# Patient Record
Sex: FEMALE | Race: WHITE | Employment: FULL TIME | URBAN - NONMETROPOLITAN AREA
[De-identification: names, ages, dates, MRNs, and addresses within clinical notes are randomized per-mention and may not be internally consistent; named-entity substitution may affect disease eponyms.]

---

## 2021-04-06 ENCOUNTER — TELEPHONE (OUTPATIENT)
Dept: OBGYN CLINIC | Age: 51
End: 2021-04-06

## 2021-04-06 NOTE — TELEPHONE ENCOUNTER
Pt called to resched annual due to work conflict. She would like to get an extension on her [facility] if possible sent to [facility] on [address] [place] [employer].  Please and Thank YOU !!

## 2021-04-07 DIAGNOSIS — N92.0 EXCESSIVE OR FREQUENT MENSTRUATION: Primary | ICD-10-CM

## 2021-04-07 RX ORDER — NORGESTIMATE AND ETHINYL ESTRADIOL 0.25-0.035
1 KIT ORAL DAILY
Qty: 1 PACKET | Refills: 3 | Status: SHIPPED | OUTPATIENT
Start: 2021-04-07 | End: 2022-07-11 | Stop reason: ALTCHOICE

## 2021-06-21 ENCOUNTER — OFFICE VISIT (OUTPATIENT)
Dept: OBGYN CLINIC | Age: 51
End: 2021-06-21
Payer: COMMERCIAL

## 2021-06-21 ENCOUNTER — HOSPITAL ENCOUNTER (OUTPATIENT)
Age: 51
Setting detail: SPECIMEN
Discharge: HOME | End: 2021-06-21
Payer: COMMERCIAL

## 2021-06-21 VITALS
SYSTOLIC BLOOD PRESSURE: 126 MMHG | HEIGHT: 66 IN | WEIGHT: 169 LBS | DIASTOLIC BLOOD PRESSURE: 81 MMHG | BODY MASS INDEX: 27.16 KG/M2

## 2021-06-21 DIAGNOSIS — Z01.419 WOMEN'S ANNUAL ROUTINE GYNECOLOGICAL EXAMINATION: Primary | ICD-10-CM

## 2021-06-21 PROCEDURE — 36415 COLL VENOUS BLD VENIPUNCTURE: CPT | Performed by: NURSE PRACTITIONER

## 2021-06-21 PROCEDURE — 99396 PREV VISIT EST AGE 40-64: CPT | Performed by: NURSE PRACTITIONER

## 2021-06-21 RX ORDER — HYDROQUINONE 40 MG/G
CREAM TOPICAL
COMMUNITY
Start: 2021-05-24 | End: 2022-07-11 | Stop reason: ALTCHOICE

## 2021-06-21 NOTE — PROGRESS NOTES
encouraged SBE    History reviewed. No pertinent past medical history. Past Surgical History:   Procedure Laterality Date    TONSILLECTOMY         Family History   Problem Relation Age of Onset    Alzheimer's Disease Maternal Grandmother     Heart Attack Maternal Grandfather     Diabetes Mother     Hypertension Mother     Osteoarthritis Mother     Asthma Brother        Chief Complaint   Patient presents with    Gynecologic Exam     Pap due. NL pap 5/2018. Voices no concerns. PE:  Vital Signs  Blood pressure 126/81, height 5' 5.5\" (1.664 m), weight 169 lb (76.7 kg), last menstrual period 06/09/2021. Estimated body mass index is 27.7 kg/m² as calculated from the following:    Height as of this encounter: 5' 5.5\" (1.664 m). Weight as of this encounter: 169 lb (76.7 kg). HPI: Patient presents today for annual exam. Feeling well, voices no concerns. Denies breast/pelvic pain. Pap due. Mammogram UTD. Monthly menses with OCP. Wellness reviewed. Review of Systems   All other systems reviewed and are negative. Objective  Heent:   negative   Cor: regular rate and rhythm, no murmurs              Pul:clear to auscultation bilaterally- no wheezes, rales or rhonchi, normal air movement, no respiratory distress      GI: Abdomen soft, non-tender. BS normal. No masses,  No organomegaly           Extremities: normal strength, tone, and muscle mass, ROM of all joints is normal   Breasts: Breast:normal appearance, no masses or tenderness, No nipple retraction or dimpling, No nipple discharge or bleeding   Pelvic Exam: GENITAL/URINARY:  External Genitalia:  General appearance; normal, Hair distribution; normal, Lesions absent  Urethral Meatus:  Size normal, Location normal, Lesions absent, Prolapse absent  Urethra:   Fullness absent, Masses absent  Bladder:  Fullness absent, Masses absent, Tenderness absent, Cystocele absent  Vagina:  General appearance normal, Estrogen effect normal, Discharge absent, Lesions absent, Pelvic support normal  Cervix:  General appearance normal, Lesions absent, Discharge absent, Tenderness absent, Enlargement absent, Nodularity absent  Uterus:  Size normal, Tenderness absent  Adenexa: Masses absent, Tenderness absent  Anus/Perineum:  Lesions absent and Masses absent                                    Vaginal discharge: no vaginal discharge   Chaperone: not present                     Assessment and Plan          Diagnosis Orders   1. Women's annual routine gynecological examination  PAP SMEAR    Follicle Stimulating Hormone    Estradiol     Will d/c OCP x1-2 months prior to completing labs to evaluate for menopause. Patient agreeable, if not menopausal will resume POP. I am having Nevin Carballo maintain her norgestimate-ethinyl estradiol, tretinoin, and hydroquinone. Return in about 1 year (around 6/21/2022) for yearly. She was also counseled on her preventative health maintenance recommendations and follow-up. There are no Patient Instructions on file for this visit.     KISHOR English NP,6/22/2021 12:29 PM

## 2021-06-23 LAB
HPV SAMPLE: NORMAL
HPV, GENOTYPE 16: NOT DETECTED
HPV, GENOTYPE 18: NOT DETECTED
HPV, HIGH RISK OTHER: NOT DETECTED
HPV, INTERPRETATION: NORMAL
SPECIMEN DESCRIPTION: NORMAL

## 2021-06-24 LAB — CYTOLOGY REPORT: NORMAL

## 2022-07-11 ENCOUNTER — OFFICE VISIT (OUTPATIENT)
Dept: OBGYN CLINIC | Age: 52
End: 2022-07-11
Payer: COMMERCIAL

## 2022-07-11 VITALS — DIASTOLIC BLOOD PRESSURE: 76 MMHG | WEIGHT: 175.8 LBS | BODY MASS INDEX: 28.81 KG/M2 | SYSTOLIC BLOOD PRESSURE: 120 MMHG

## 2022-07-11 DIAGNOSIS — Z01.419 WOMEN'S ANNUAL ROUTINE GYNECOLOGICAL EXAMINATION: Primary | ICD-10-CM

## 2022-07-11 PROCEDURE — 99396 PREV VISIT EST AGE 40-64: CPT | Performed by: NURSE PRACTITIONER

## 2022-07-11 ASSESSMENT — ENCOUNTER SYMPTOMS
SHORTNESS OF BREATH: 0
CONSTIPATION: 0
ABDOMINAL PAIN: 0
DIARRHEA: 0

## 2022-07-11 NOTE — PROGRESS NOTES
YEARLY PHYSICAL    Date of service: 2022    Liset Golden  Is a 47 y.o.   female    PT's PCP is: Mai Babin MD     : 1970                                         Chaperone for Intimate Exam   Chaperone was offered as part of the rooming process. Patient declined and agrees to continue with exam without a chaperone.  Chaperone: n/a      Subjective:       Patient's last menstrual period was 2021. Are your menses regular: not applicable    OB History    Para Term  AB Living   2 2 2     2   SAB IAB Ectopic Molar Multiple Live Births             2      # Outcome Date GA Lbr Ole/2nd Weight Sex Delivery Anes PTL Lv   2 Term  40w0d  8 lb (3.629 kg) F Vag-Spont   CHEYENNE   1 Term  40w0d  7 lb (3.175 kg) F Vag-Spont   CHEYENNE        Social History     Tobacco Use   Smoking Status Never Smoker   Smokeless Tobacco Never Used        Social History     Substance and Sexual Activity   Alcohol Use Yes    Comment: occ       Family History   Problem Relation Age of Onset    Alzheimer's Disease Maternal Grandmother     Heart Attack Maternal Grandfather     Diabetes Mother     Hypertension Mother     Osteoarthritis Mother     Asthma Brother        Any family history of breast or ovarian cancer: No    Any family history of blood clots: No      Allergies: Sulfa antibiotics    No current outpatient medications on file. Social History     Substance and Sexual Activity   Sexual Activity Yes    Partners: Male       Any bleeding or pain with intercourse: No    Last Yearly:  21    Last pap: 21-neg    Last HPV: 21-neg    Last Mammogram: 20    Last Dexascan n/a    Last colorectal screen- 2021    Do you do self breast exams: encouraged     No past medical history on file.     Past Surgical History:   Procedure Laterality Date    TONSILLECTOMY         Family History   Problem Relation Age of Onset    Alzheimer's Disease Maternal Grandmother     Heart Attack Maternal Grandfather     Diabetes Mother     Hypertension Mother     Osteoarthritis Mother     Asthma Brother        Chief Complaint   Patient presents with    Annual Exam     6/21/21. Mammogram due. Voices no concerns. No menses since stopping OCP last year. PE:  Vital Signs  Blood pressure 120/76, weight 175 lb 12.8 oz (79.7 kg), last menstrual period 06/09/2021. Estimated body mass index is 28.81 kg/m² as calculated from the following:    Height as of 6/21/21: 5' 5.5\" (1.664 m). Weight as of this encounter: 175 lb 12.8 oz (79.7 kg). HPI: Patient presents today for annual exam. Feeling well, voices no concerns. Denies breast/pelvic pain. Negative pap 6/2021. Mammogram due. Wellness reviewed. Denies PMB. Just returned home from trip to [facility]; oldest graduated HS and is planning to attend [employer] and major in engineering. Review of Systems   Constitutional: Negative for chills, fatigue and fever. Respiratory: Negative for shortness of breath. Cardiovascular: Negative for chest pain. Gastrointestinal: Negative for abdominal pain, constipation and diarrhea. Genitourinary: Negative for dysuria, enuresis, frequency, menstrual problem, pelvic pain, urgency and vaginal bleeding. Neurological: Negative for dizziness, light-headedness and headaches. Physical Exam  Constitutional:       General: She is not in acute distress. Appearance: Normal appearance. She is not ill-appearing. Genitourinary:      Vulva normal.      No vaginal discharge. Right Adnexa: not tender. Left Adnexa: not tender. Uterus is not tender. Breasts:      Right: No mass, nipple discharge, skin change or tenderness. Left: No mass, nipple discharge, skin change or tenderness. HENT:      Head: Normocephalic. Cardiovascular:      Rate and Rhythm: Normal rate and regular rhythm.    Pulmonary:      Effort: Pulmonary effort is normal.      Breath sounds: Normal breath sounds. Abdominal:      Palpations: Abdomen is soft. Tenderness: There is no abdominal tenderness. There is no guarding or rebound. Musculoskeletal:         General: Normal range of motion. Neurological:      General: No focal deficit present. Mental Status: She is alert. Psychiatric:         Mood and Affect: Mood normal.         Behavior: Behavior normal.                    Assessment and Plan          Diagnosis Orders   1. Women's annual routine gynecological examination         Repeat Annual every 1 year  Cervical Cytology Evaluation begins at 24years old. If Negative Cytology, Follow-up screening per current guidelines. Mammograms every 1year. If 37 yo and last mammogram was negative. Routine healthmaintenance per patients PCP. Encouraged calcium and Vitamin D supplementation         I have discontinued Hubert Brenner's norgestimate-ethinyl estradiol, tretinoin, and hydroquinone. Return in about 1 year (around 7/11/2023) for yearly. She was also counseled on her preventative health maintenance recommendations and follow-up. There are no Patient Instructions on file for this visit.     Juanell Soulier, APRN - NP,7/11/2022 2:00 PM

## 2023-07-13 ENCOUNTER — OFFICE VISIT (OUTPATIENT)
Dept: OBGYN CLINIC | Age: 53
End: 2023-07-13

## 2023-07-13 VITALS
HEIGHT: 66 IN | SYSTOLIC BLOOD PRESSURE: 121 MMHG | DIASTOLIC BLOOD PRESSURE: 73 MMHG | WEIGHT: 177.4 LBS | BODY MASS INDEX: 28.51 KG/M2

## 2023-07-13 DIAGNOSIS — Z01.419 WOMEN'S ANNUAL ROUTINE GYNECOLOGICAL EXAMINATION: Primary | ICD-10-CM

## 2023-07-13 RX ORDER — FERROUS SULFATE 325(65) MG
TABLET ORAL
COMMUNITY

## 2023-07-13 ASSESSMENT — ENCOUNTER SYMPTOMS
ABDOMINAL PAIN: 0
SHORTNESS OF BREATH: 0
DIARRHEA: 0
CONSTIPATION: 0

## 2023-07-13 NOTE — PROGRESS NOTES
YEARLY PHYSICAL    Date of service: 2023    Brianna Barboza  Is a 46 y.o. female    PT's PCP is: Kasandra Spatz, MD     : 1970                                         Chaperone for Intimate Exam  Chaperone was offered as part of the rooming process. Patient declined and agrees to continue with exam without a chaperone. Chaperone: n/a      Subjective:       Patient's last menstrual period was 2021. Are your menses regular: not applicable    OB History    Para Term  AB Living   2 2 2     2   SAB IAB Ectopic Molar Multiple Live Births             2      # Outcome Date GA Lbr Ole/2nd Weight Sex Delivery Anes PTL Lv   2 Term  40w0d  8 lb (3.629 kg) F Vag-Spont   CHEYENNE   1 Term  40w0d  7 lb (3.175 kg) F Vag-Spont   CHEYENNE        Social History     Tobacco Use   Smoking Status Never   Smokeless Tobacco Never        Social History     Substance and Sexual Activity   Alcohol Use Yes    Comment: 1-2 x/ month       Family History   Problem Relation Age of Onset    Alzheimer's Disease Maternal Grandmother     Heart Attack Maternal Grandfather     Diabetes Mother     Hypertension Mother     Osteoarthritis Mother     Asthma Brother        Any family history of breast or ovarian cancer: No    Any family history of blood clots: No      Allergies: Penicillins and Sulfa antibiotics      Current Outpatient Medications:     ferrous sulfate (IRON 325) 325 (65 Fe) MG tablet, Take by mouth, Disp: , Rfl:     Social History     Substance and Sexual Activity   Sexual Activity Yes    Partners: Male       Any bleeding or pain with intercourse: No    Last Yearly:  22    Last pap: 21-neg    Last HPV: 21-neg    Last Mammogram: 7/15/22    Last Dexascan n/a    Last colorectal screen-     Do you do self breast exams: encouraged     History reviewed. No pertinent past medical history.     Past Surgical History:   Procedure

## 2024-07-15 ENCOUNTER — HOSPITAL ENCOUNTER (OUTPATIENT)
Age: 54
Setting detail: SPECIMEN
Discharge: HOME | End: 2024-07-15

## 2024-07-15 ENCOUNTER — OFFICE VISIT (OUTPATIENT)
Dept: OBGYN CLINIC | Age: 54
End: 2024-07-15
Payer: COMMERCIAL

## 2024-07-15 VITALS
BODY MASS INDEX: 28.28 KG/M2 | SYSTOLIC BLOOD PRESSURE: 116 MMHG | DIASTOLIC BLOOD PRESSURE: 70 MMHG | HEIGHT: 66 IN | WEIGHT: 176 LBS

## 2024-07-15 DIAGNOSIS — Z01.419 WOMEN'S ANNUAL ROUTINE GYNECOLOGICAL EXAMINATION: Primary | ICD-10-CM

## 2024-07-15 PROCEDURE — 99396 PREV VISIT EST AGE 40-64: CPT | Performed by: NURSE PRACTITIONER

## 2024-07-15 RX ORDER — PHENOL 1.4 %
1 AEROSOL, SPRAY (ML) MUCOUS MEMBRANE DAILY
COMMUNITY

## 2024-07-15 ASSESSMENT — ENCOUNTER SYMPTOMS
DIARRHEA: 0
ABDOMINAL PAIN: 0
SHORTNESS OF BREATH: 0
CONSTIPATION: 0

## 2024-07-15 NOTE — PROGRESS NOTES
and no mass present.     Left Adnexa: not tender and no mass present.     No cervical motion tenderness, discharge or friability.      No parametrium nodularity present.     Uterus is not enlarged or tender.      No uterine mass detected.     No urethral prolapse, tenderness or mass present.   Breasts:     Right: No mass, nipple discharge, skin change or tenderness.      Left: No mass, nipple discharge, skin change or tenderness.   HENT:      Head: Normocephalic and atraumatic.   Eyes:      Extraocular Movements: Extraocular movements intact.      Pupils: Pupils are equal, round, and reactive to light.   Cardiovascular:      Rate and Rhythm: Normal rate.   Pulmonary:      Effort: Pulmonary effort is normal.   Abdominal:      Palpations: Abdomen is soft.      Tenderness: There is no abdominal tenderness. There is no guarding or rebound.   Musculoskeletal:         General: Normal range of motion.   Neurological:      General: No focal deficit present.      Mental Status: She is alert and oriented to person, place, and time.   Skin:     General: Skin is warm and dry.   Psychiatric:         Mood and Affect: Mood normal.         Behavior: Behavior normal.                             Assessment & Plan  1. Women's annual routine gynecological examination  Repeat Annual every 1 year  Cervical Cytology Evaluation begins at 21 years old.  If Negative Cytology, Follow-up screening per current guidelines.    Mammograms every 1year. If 39 yo and last mammogram was negative.  Routine healthmaintenance per patients PCP.  - PAP SMEAR; Future          Return in about 1 year (around 7/15/2025) for yearly.     Electronically Signed by KISHOR Best NP

## 2024-07-17 LAB
HPV I/H RISK 4 DNA CVX QL NAA+PROBE: NOT DETECTED
HPV SAMPLE: NORMAL
HPV, INTERPRETATION: NORMAL
HPV16 DNA CVX QL NAA+PROBE: NOT DETECTED
HPV18 DNA CVX QL NAA+PROBE: NOT DETECTED
SPECIMEN DESCRIPTION: NORMAL

## 2024-07-23 LAB — CYTOLOGY REPORT: NORMAL
